# Patient Record
Sex: FEMALE | Race: WHITE | HISPANIC OR LATINO
[De-identification: names, ages, dates, MRNs, and addresses within clinical notes are randomized per-mention and may not be internally consistent; named-entity substitution may affect disease eponyms.]

---

## 2024-09-09 ENCOUNTER — APPOINTMENT (OUTPATIENT)
Dept: AFTER HOURS CARE | Facility: EMERGENCY ROOM | Age: 44
End: 2024-09-09
Payer: SELF-PAY

## 2024-09-09 ENCOUNTER — EMERGENCY (EMERGENCY)
Facility: HOSPITAL | Age: 44
LOS: 1 days | Discharge: HOME | End: 2024-09-09
Attending: EMERGENCY MEDICINE
Payer: MEDICAID

## 2024-09-09 VITALS
HEART RATE: 59 BPM | SYSTOLIC BLOOD PRESSURE: 147 MMHG | WEIGHT: 153.66 LBS | OXYGEN SATURATION: 100 % | DIASTOLIC BLOOD PRESSURE: 87 MMHG | RESPIRATION RATE: 14 BRPM | TEMPERATURE: 98 F

## 2024-09-09 DIAGNOSIS — H60.90 UNSPECIFIED OTITIS EXTERNA, UNSPECIFIED EAR: ICD-10-CM

## 2024-09-09 DIAGNOSIS — H10.32 UNSPECIFIED ACUTE CONJUNCTIVITIS, LEFT EYE: ICD-10-CM

## 2024-09-09 PROCEDURE — 99284 EMERGENCY DEPT VISIT MOD MDM: CPT

## 2024-09-09 PROCEDURE — 99283 EMERGENCY DEPT VISIT LOW MDM: CPT

## 2024-09-09 PROCEDURE — 99202 OFFICE O/P NEW SF 15 MIN: CPT

## 2024-09-09 RX ORDER — CIPROFLOXACIN AND DEXAMETHASONE 3; 1 MG/ML; MG/ML
0.3-0.1 SUSPENSION/ DROPS AURICULAR (OTIC) TWICE DAILY
Qty: 1 | Refills: 0 | Status: ACTIVE | COMMUNITY
Start: 2024-09-09 | End: 1900-01-01

## 2024-09-09 RX ORDER — FLUORESCEIN SODIUM 2 %
1 DROPS OPHTHALMIC (EYE) ONCE
Refills: 0 | Status: COMPLETED | OUTPATIENT
Start: 2024-09-09 | End: 2024-09-09

## 2024-09-09 RX ORDER — OFLOXACIN 3 MG/ML
0.3 SOLUTION/ DROPS OPHTHALMIC 4 TIMES DAILY
Qty: 1 | Refills: 0 | Status: ACTIVE | COMMUNITY
Start: 2024-09-09 | End: 1900-01-01

## 2024-09-09 RX ORDER — TETRACAINE HCL 0.5 %
1 DROPS OPHTHALMIC (EYE) ONCE
Refills: 0 | Status: COMPLETED | OUTPATIENT
Start: 2024-09-09 | End: 2024-09-09

## 2024-09-09 RX ADMIN — Medication 1 APPLICATION(S): at 13:35

## 2024-09-09 RX ADMIN — Medication 1 DROP(S): at 13:35

## 2024-09-09 NOTE — ED PROVIDER NOTE - PATIENT PORTAL LINK FT
You can access the FollowMyHealth Patient Portal offered by [facility] by registering at the following website: [email or web address]. By joining [employer]’s FollowMyHealth portal, you will also be able to view your health information using other applications (apps) compatible with our system.

## 2024-09-09 NOTE — ED PROVIDER NOTE - CLINICAL SUMMARY MEDICAL DECISION MAKING FREE TEXT BOX
44-year-old female no significant past medical history presenting today with left eye redness and left ear pain.  Patient admits she was at the beach yesterday and there was a lot of landed she feels like something might have gotten into her eye.  Admits she was rubbing her left eye a lot yesterday, today woke up with eye redness.  Denies any discharge.  Patient is not a contact lens wear.  Denies blurry vision.  Also admits yesterday she felt a "pop" in her left ear, started to drain clear fluid this morning and complaining of pain.  Denies any recent swimming.  No fevers. Vitals normal. Pt well appearing. LEft eye: + chemosis and conjunctival erythema. LIV. EOM intact. + pterygium to medial cornea. LEFT ear: + tenderness with auricular movement  with internal canal inflammation and white debris. TM nonvisualized. plan for woods lamp examination for eye. Left ear most likely OE, plan for ciprodex drops.

## 2024-09-09 NOTE — HISTORY OF PRESENT ILLNESS
[Home] : at home, [unfilled] , at the time of the visit. [Other Location: e.g. Home (Enter Location, City,State)___] : at [unfilled] [Verbal consent obtained from patient] : the patient, [unfilled] [FreeTextEntry8] : 44 y F ho GERD c L ear pain and discharge x several days and now 1 day L eye erythema/tearing Seen at [facility] ED earlier today and was evaluated, stated in d/c paperwork that plan was for ciprodex and oflox gtt but no medications were sent to pharmacy No new relevant information from this visit relative to [facility] ED note.  Rx from ED in pt's d/c paperwork: "4 drops OF CIPRODEX INTO LEFT EAR 2 TIMES A DAY FOR 7 DAYS; 2 DROPS OF OFLOXACIN INTO LEFT EYE 4 TIMES A DAY FOR 5 DAYS."  PMH -- GERD on omeprazole NKDA

## 2024-09-09 NOTE — ED PROVIDER NOTE - ATTENDING APP SHARED VISIT CONTRIBUTION OF CARE
indep eval  pt with fb sensation OS pain L ear  scleral injection os no fb on pe  L oe on exam  pplan treat w meds  also will give bereavement resources as pt lost her child and cries frequently  not si hi

## 2024-09-09 NOTE — ED PROVIDER NOTE - CARE PROVIDERS DIRECT ADDRESSES
[email or web address]

## 2024-09-09 NOTE — ED PROVIDER NOTE - NSFOLLOWUPINSTRUCTIONS_ED_ALL_ED_FT
4 drops OF CIPRODEX INTO LEFT EAR 2 TIMES A DAY FOR 7 DAYS.   2 DROPS OF OFLOXACIN INTO LEFT EYE 4 TIMES A DAY FOR 5 DAYS.    FOLLOW UP WITH ENT OR OPHTHALMOLOGIST IF SYMPTOMS PERSIST.     Contact a health care provider if:  You keep having eye pain and other symptoms for more than 2–3 days.  You have new symptoms, such as more redness, tearing, or fluid (discharge) coming from your eye.  You have swollen eyelids.  Your vision gets much worse.  You have discharge that makes your eyelids stick together in the morning.  Your eye patch becomes so loose that you can blink your eye.  Symptoms come back after your abrasion heals.  You have a fever.  Your ear is still red, swollen, painful, or draining pus after 3 days.  Your redness, swelling, or pain gets worse.  You have a severe headache.    Get help right away if:  You have redness, swelling, and pain or tenderness in the area behind your ear.  You have severe eye pain that does not get better with medicine.  You have severe vision loss.

## 2024-09-09 NOTE — ASSESSMENT
[FreeTextEntry1] : Otitis externa and OS conjunctivitis.  Both seem uncomplicated.  No c/f reassessment in ED.

## 2024-09-09 NOTE — PHYSICAL EXAM
[de-identified] : PLEASE SEE HPI FOR ADDITIONAL RELEVANT PHYSICAL EXAM FINDINGS GENERAL: no acute distress, nontoxic HEAD: normocephalic EYES: no scleral icterus NECK: trachea midline, Full ROM RESP: no respiratory distress ABD: nondistended MSK: no gross deformity NEURO: alert & fully oriented SKIN: no rash PSYCH: cooperative, good insight, appropriate, fluent speech  [de-identified] : Conj injection and very mild upper lid swelling, no surrounding swelling or erythema in periorbital soft tissue.  EOM full and painless

## 2024-09-09 NOTE — ED PROVIDER NOTE - CARE PROVIDER_API CALL
Michael Cobian  Ophthalmology  [address], [address]  [place] [address]  Phone: [phone]  Fax: [phone]  Established Patient  Follow Up Time:     Tim Anderson  Otolaryngology  [address] [place] [address], [address]  [place] [address]  Phone: [phone]  Fax: [phone]  Established Patient  Follow Up Time:

## 2024-09-09 NOTE — ED PROVIDER NOTE - OBJECTIVE STATEMENT
44-year-old female no significant past medical history presenting today with left eye redness and left ear pain.  Patient admits she was at the beach yesterday and there was a lot of landed she feels like something might have gotten into her eye.  Admits she was rubbing her left eye a lot yesterday, today woke up with eye redness.  Denies any discharge.  Patient is not a contact lens wear.  Denies blurry vision.  Also admits yesterday she felt a "pop" in her left ear, started to drain clear fluid this morning and complaining of pain.  Denies any recent swimming.  No fevers.

## 2024-09-09 NOTE — ED PROVIDER NOTE - PROVIDER TOKENS
PROVIDER:[TOKEN:[[id number]],ESTABLISHEDPATIENT:[T]],PROVIDER:[TOKEN:[[id number]],ESTABLISHEDPATIENT:[T]]

## 2024-09-09 NOTE — HISTORY OF PRESENT ILLNESS
[Home] : at home, [unfilled] , at the time of the visit. [Other Location: e.g. Home (Enter Location, City,State)___] : at [unfilled] [Verbal consent obtained from patient] : the patient, [unfilled] [FreeTextEntry8] : 44 y F ho GERD c L ear pain and discharge x several days and now 1 day L eye erythema/tearing Seen at [facility] ED earlier today and was evaluated, stated in d/c paperwork that plan was for ciprodex and oflox gtt but no medications were sent to pharmacy No new relevant information from this visit relative to [facility] ED note.  Rx from ED in pt's d/c paperwork: "4 drops OF CIPRODEX INTO LEFT EAR 2 TIMES A DAY FOR 7 DAYS; 2 DROPS OF OFLOXACIN INTO LEFT EYE 4 TIMES A DAY FOR 5 DAYS."  PMH -- GERD on omeprazole NKDA

## 2024-09-09 NOTE — PLAN
[FreeTextEntry1] : Oflox Decadron APAP Ophtho and ENT referrals sent by ED. Very strict ED precautions discussed

## 2024-09-09 NOTE — PHYSICAL EXAM
[de-identified] : PLEASE SEE HPI FOR ADDITIONAL RELEVANT PHYSICAL EXAM FINDINGS GENERAL: no acute distress, nontoxic HEAD: normocephalic EYES: no scleral icterus NECK: trachea midline, Full ROM RESP: no respiratory distress ABD: nondistended MSK: no gross deformity NEURO: alert & fully oriented SKIN: no rash PSYCH: cooperative, good insight, appropriate, fluent speech  [de-identified] : Conj injection and very mild upper lid swelling, no surrounding swelling or erythema in periorbital soft tissue.  EOM full and painless